# Patient Record
Sex: FEMALE | Race: AMERICAN INDIAN OR ALASKA NATIVE
[De-identification: names, ages, dates, MRNs, and addresses within clinical notes are randomized per-mention and may not be internally consistent; named-entity substitution may affect disease eponyms.]

---

## 2018-12-30 ENCOUNTER — HOSPITAL ENCOUNTER (EMERGENCY)
Dept: HOSPITAL 5 - ED | Age: 23
Discharge: HOME | End: 2018-12-30
Payer: MEDICAID

## 2018-12-30 VITALS — SYSTOLIC BLOOD PRESSURE: 122 MMHG | DIASTOLIC BLOOD PRESSURE: 76 MMHG

## 2018-12-30 DIAGNOSIS — O23.591: ICD-10-CM

## 2018-12-30 DIAGNOSIS — N80.1: ICD-10-CM

## 2018-12-30 DIAGNOSIS — O20.0: Primary | ICD-10-CM

## 2018-12-30 DIAGNOSIS — Z3A.01: ICD-10-CM

## 2018-12-30 LAB
ALBUMIN SERPL-MCNC: 4.2 G/DL (ref 3.9–5)
ALT SERPL-CCNC: 23 UNITS/L (ref 7–56)
BASOPHILS # (AUTO): 0 K/MM3 (ref 0–0.1)
BASOPHILS NFR BLD AUTO: 0.4 % (ref 0–1.8)
BILIRUB UR QL STRIP: (no result)
BLOOD UR QL VISUAL: (no result)
BUN SERPL-MCNC: 9 MG/DL (ref 7–17)
BUN/CREAT SERPL: 11 %
CALCIUM SERPL-MCNC: 9 MG/DL (ref 8.4–10.2)
EOSINOPHIL # BLD AUTO: 0 K/MM3 (ref 0–0.4)
EOSINOPHIL NFR BLD AUTO: 0 % (ref 0–4.3)
HCT VFR BLD CALC: 36.5 % (ref 30.3–42.9)
HEMOLYSIS INDEX: 0
HGB BLD-MCNC: 12.1 GM/DL (ref 10.1–14.3)
HYALINE CASTS #/AREA URNS LPF: 2 /LPF
LYMPHOCYTES # BLD AUTO: 1.6 K/MM3 (ref 1.2–5.4)
LYMPHOCYTES NFR BLD AUTO: 18.9 % (ref 13.4–35)
MCHC RBC AUTO-ENTMCNC: 33 % (ref 30–34)
MCV RBC AUTO: 83 FL (ref 79–97)
MONOCYTES # (AUTO): 0.7 K/MM3 (ref 0–0.8)
MONOCYTES % (AUTO): 8.6 % (ref 0–7.3)
MUCOUS THREADS #/AREA URNS HPF: (no result) /HPF
PH UR STRIP: 6 [PH] (ref 5–7)
PLATELET # BLD: 135 K/MM3 (ref 140–440)
RBC # BLD AUTO: 4.41 M/MM3 (ref 3.65–5.03)
RBC #/AREA URNS HPF: 4 /HPF (ref 0–6)
UROBILINOGEN UR-MCNC: 2 MG/DL (ref ?–2)
WBC #/AREA URNS HPF: 8 /HPF (ref 0–6)

## 2018-12-30 PROCEDURE — 76801 OB US < 14 WKS SINGLE FETUS: CPT

## 2018-12-30 PROCEDURE — 81001 URINALYSIS AUTO W/SCOPE: CPT

## 2018-12-30 PROCEDURE — 76817 TRANSVAGINAL US OBSTETRIC: CPT

## 2018-12-30 PROCEDURE — 86901 BLOOD TYPING SEROLOGIC RH(D): CPT

## 2018-12-30 PROCEDURE — 86850 RBC ANTIBODY SCREEN: CPT

## 2018-12-30 PROCEDURE — 84703 CHORIONIC GONADOTROPIN ASSAY: CPT

## 2018-12-30 PROCEDURE — 86900 BLOOD TYPING SEROLOGIC ABO: CPT

## 2018-12-30 PROCEDURE — 80053 COMPREHEN METABOLIC PANEL: CPT

## 2018-12-30 PROCEDURE — 85025 COMPLETE CBC W/AUTO DIFF WBC: CPT

## 2018-12-30 PROCEDURE — 36415 COLL VENOUS BLD VENIPUNCTURE: CPT

## 2018-12-30 PROCEDURE — 99284 EMERGENCY DEPT VISIT MOD MDM: CPT

## 2018-12-30 PROCEDURE — 84702 CHORIONIC GONADOTROPIN TEST: CPT

## 2018-12-30 NOTE — ULTRASOUND REPORT
FINAL REPORT



EXAM:  US OB &lt;= 14 WEEKS FETUS



HISTORY:  preg, abd pain 



TECHNIQUE:  Grayscale and color and spectral doppler ultrasound imaging of the pelvis was performed  
transabdominally and transvaginally.



PRIORS:  Pelvic ultrasound 07/03/2016.



FINDINGS:  

Uterus: The uterus is homogeneous in echogenicity without focal mass. The uterus measures 9.2 x 4.4 x
 5.7 centimeters. No intrauterine or ectopic pregnancy was seen. The endometrium is normal in thickne
ss measuring 4 millimeters.



Ovaries: Mildly complex right ovarian lesion is seen measuring 1.8 x 1.8 x 1.1 centimeters. A complex
 left ovarian lesion is seen measuring 1.5 x 1.1 x 1.0 centimeters. Normal arterial waveforms were se
en to the ovaries. Normal flow is seen to the ovaries. The right ovary measures 2.4 x 1.4 x 1.8 centi
meters. The left ovary measures 2.4 x 1.9 x 2.0 centimeters. 



Free fluid: None. 



IMPRESSION:  





1. No intrauterine or ectopic pregnancy identified. Recommend follow-up beta HCG and pelvic ultrasoun
d as clinically indicated. 

2. Mildly complex bilateral ovarian lesions may represent hemorrhagic cysts versus endometriomas. Rec
ommend followup pelvic ultrasound in 6-10 weeks.

## 2018-12-30 NOTE — ULTRASOUND REPORT
FINAL REPORT



EXAM:  US OB TRANSVAGINAL



HISTORY:  preg, abd pain 



TECHNIQUE:  Grayscale and color and spectral doppler ultrasound imaging of the pelvis was performed t
ransabdominally and transvaginally.



PRIORS:  Pelvic ultrasound 07/03/2016.



FINDINGS:  

Uterus: The uterus is homogeneous in echogenicity without focal mass. The uterus measures 9.2 x 4.4 x
 5.7 centimeters. No intrauterine or ectopic pregnancy was seen. The endometrium is normal in thickne
ss measuring 4 millimeters.



Ovaries: Mildly complex right ovarian lesion is seen measuring 1.8 x 1.8 x 1.1 centimeters. A complex
 left ovarian lesion is seen measuring 1.5 x 1.1 x 1.0 centimeters. Normal arterial waveforms were se
en to the ovaries. Normal flow is seen to the ovaries. The right ovary measures 2.4 x 1.4 x 1.8 centi
meters. The left ovary measures 2.4 x 1.9 x 2.0 centimeters. 



Free fluid: None. 



IMPRESSION:  





1. No intrauterine or ectopic pregnancy identified. Recommend follow-up beta HCG and pelvic ultrasoun
d as clinically indicated. 

2. Mildly complex bilateral ovarian lesions may represent hemorrhagic cysts versus endometriomas. Rec
ommend followup pelvic ultrasound in 6-10 weeks.

## 2018-12-30 NOTE — EMERGENCY DEPARTMENT REPORT
HPI





- General


Chief Complaint: Abdominal Pain


Time Seen by Provider: 12/30/18 09:45





- HPI


HPI: 


23-year-old  female presents to the emergency department with 

complaint of a 2 to three-day history of lower abdominal discomfort and nausea 

and vomiting.  The patient says "I cannot keep anything down."  She has not 

taken anything for her symptoms prior to arrival.  No recent travel or sick 

contacts at home.  She denies any past medical history.  Patient says that she 

is currently on her menstrual cycle and having some abnormal vaginal bleeding.








ED Past Medical Hx





- Past Medical History


Previous Medical History?: No


Additional medical history: Miscarriage x1





- Surgical History


Past Surgical History?: No





- Social History


Smoking Status: Never Smoker


Substance Use Type: None





- Medications


Home Medications: 


                                Home Medications











 Medication  Instructions  Recorded  Confirmed  Last Taken  Type


 


Ibuprofen [Motrin] 800 mg PO Q8HR PRN #30 tablet 07/03/16  Unknown Rx


 


HYDROcodone/APAP 5-325 [Datil 1 each PO Q6HR PRN #10 tablet 12/30/18  Unknown Rx





5/325]     


 


Ondansetron [Zofran Odt] 4 mg PO Q8HR PRN #12 tab.rapdis 12/30/18  Unknown Rx














ED Review of Systems


ROS: 


Stated complaint: ABD PAIN; N/V


Other details as noted in HPI





Comment: All other systems reviewed and negative


Constitutional: denies: chills, fever


Eyes: denies: eye pain, eye discharge, vision change


ENT: denies: ear pain, throat pain


Respiratory: denies: cough, shortness of breath, wheezing


Cardiovascular: denies: chest pain, palpitations


Gastrointestinal: abdominal pain, nausea, vomiting


Genitourinary: denies: dysuria, discharge


Musculoskeletal: denies: back pain, arthralgia


Skin: denies: rash, lesions


Neurological: denies: headache, weakness





Physical Exam





- Physical Exam


Vital Signs: 


                                   Vital Signs











  12/30/18





  05:05


 


Temperature 97.6 F


 


Pulse Rate 50 L


 


Respiratory 18





Rate 


 


Blood Pressure 142/75


 


O2 Sat by Pulse 100





Oximetry 











Physical Exam: 





GENERAL: The patient is well-developed well-nourished.


HEENT: Normocephalic.  Atraumatic.   Patient has moist mucous membranes.


EYES:  Extraocular motions are intact.  Pupils are equal and reactive to light 

bilaterally.


NECK: Supple.  Trachea is midline.


CHEST/LUNGS: Clear to auscultation.  There is no respiratory distress noted.  


HEART/CARDIOVASCULAR: Regular.  There is no tachycardia.  There is no obvious 

murmur.


ABDOMEN: Abdomen is soft.  Lower abdominal tenderness to palpation.  No 

guarding.  Patient has normal bowel sounds.  There is no abdominal distention.


SKIN: Skin is warm and dry.


NEURO: The patient is awake, alert, and oriented.  The patient is cooperative.  

The patient has no focal neurologic deficits.  The patient has normal speech.


MUSCULOSKELETAL: There is no tenderness or deformity.  There is no limitation 

range of motion.  There is no evidence of acute injury.





ED Course


                                   Vital Signs











  12/30/18





  05:05


 


Temperature 97.6 F


 


Pulse Rate 50 L


 


Respiratory 18





Rate 


 


Blood Pressure 142/75


 


O2 Sat by Pulse 100





Oximetry 














- Consultations


Consultation #1: 


I spoke with the OB/GYN on-call, Dr. Judith Avila, regarding whether or not to

give the patient a RhoGAM shot.  The patient's a positive blood.  She is 

received the RhoGAM shot in the past with previous pregnancies.  The patient 

only has a beta hCG of 16 and may have had a chemical pregnancy or recent 

miscarriage, but Dr. Avila says that it would be prudent to proceed with the 

RhoGAM shot.


12/30/18 16:09








ED Medical Decision Making





- Lab Data


Result diagrams: 


                                 12/30/18 06:02





                                 12/30/18 06:02





- Radiology Data


Radiology results: report reviewed





EXAM: US OB TRANSVAGINAL 





HISTORY: preg, abd pain 





TECHNIQUE: Grayscale and color and spectral doppler ultrasound imaging of the 

pelvis was performed


transabdominally and transvaginally. 





PRIORS: Pelvic ultrasound 07/03/2016. 





FINDINGS: 


Uterus: The uterus is homogeneous in echogenicity without focal mass. The uterus

measures 9.2 x 4.4


x 5.7 centimeters. No intrauterine or ectopic pregnancy was seen. The 

endometrium is normal in 


thickness measuring 4 millimeters. 





Ovaries: Mildly complex right ovarian lesion is seen measuring 1.8 x 1.8 x 1.1 

centimeters. A 


complex left ovarian lesion is seen measuring 1.5 x 1.1 x 1.0 centimeters. 

Normal arterial waveforms


were seen to the ovaries. Normal flow is seen to the ovaries. The right ovary 

measures 2.4 x 1.4 x 


1.8 centimeters. The left ovary measures 2.4 x 1.9 x 2.0 centimeters. 





Free fluid: None. 





IMPRESSION: 








1. No intrauterine or ectopic pregnancy identified. Recommend follow-up beta HCG

and pelvic 


ultrasound as clinically indicated. 


2. Mildly complex bilateral ovarian lesions may represent hemorrhagic cysts 

versus endometriomas. 


Recommend followup pelvic ultrasound in 6-10 weeks. 











Transcribed By: MG 


Dictated By: ZNAE DAILEY MD 


Electronically Authenticated By: ZANE DAILEY MD 


Signed Date/Time: 12/30/18 1227 





- Medical Decision Making


Patient presents with some lower abdominal and pelvic discomfort, recent vaginal

bleeding, nausea or vomiting.  Patient was found to have a positive qualitative 

pregnancy test.  The rest of the blood work was unremarkable.  The patient says 

that she's been having copious nausea and vomiting but there are only trace 

ketones in the urine.  There is no renal sufficiency.  I ordered a quantitative 

beta hCG came back at 16.  An ultrasound was done that does not show any signs 

of any intrauterine or ectopic pregnancy.  There are mildly complex bilateral 

ovarian lesions that could be hemorrhagic cysts versus endometriomas.  I spoke 

with OB/GYN on call recommended giving the RhoGAM shot.  The patient was given a

dose of Norco and Zofran.  She was reevaluated multiple times over multiple 

hours and is having improvement in her abdominal discomfort and there has been 

no vomiting since being in the emergency department.  Patient is asking for 

something to eat and saying that she is mostly having hunger pains.  The patient

understands that she has most likely had or is having a miscarriage but that she

still needs to follow up with OB/GYN for a repeat beta hCG and possibly 

ultrasound to see if it is possibly rising.  She's been given multiple GYN 

services in the area.  She will return to the ER with any worsening of her 

symptoms or any acute distress.








- Differential Diagnosis


pregnancy, threatened miscarriage, spontaneous miscarriage, fibroids, UTI


Critical Care Time: No


Critical care attestation.: 


If time is entered above; I have spent that time in minutes in the direct care 

of this critically ill patient, excluding procedure time.








ED Disposition


Clinical Impression: 


 Endometrioma of ovary, Threatened miscarriage





Disposition: DC-01 TO HOME OR SELFCARE


Is pt being admited?: No


Condition: Stable


Instructions:  Threatened Miscarriage (ED), Ovarian Cyst (ED), Abdominal Pain 

(ED)


Additional Instructions: 


Please follow up with an OB/GYN in the next few days.  Return to the emergency 

Department with any worsening of your symptoms or any acute distress.





You have been prescribed a medication that can be sedating.  Therefore, this 

medication cannot be taken prior to driving, working, being responsible for 

children, and cannot be mixed with alcohol of any quantity.


Prescriptions: 


HYDROcodone/APAP 5-325 [Datil 5/325] 1 each PO Q6HR PRN #10 tablet


 PRN Reason: Pain


Ondansetron [Zofran Odt] 4 mg PO Q8HR PRN #12 tab.rapdis


 PRN Reason: Nausea


Referrals: 


[facility] [Primary Care Provider] - 3-5 Days


[facility] [Provider Group] - 3-5 Days


[facility] [Provider Group] - 3-5 Days


[facility] [Provider Group] - 3-5 Days


Time of Disposition: 14:44